# Patient Record
Sex: MALE | URBAN - METROPOLITAN AREA
[De-identification: names, ages, dates, MRNs, and addresses within clinical notes are randomized per-mention and may not be internally consistent; named-entity substitution may affect disease eponyms.]

---

## 2018-06-15 ENCOUNTER — TELEPHONE (OUTPATIENT)
Dept: FAMILY MEDICINE CLINIC | Facility: CLINIC | Age: 53
End: 2018-06-15

## 2018-06-15 ENCOUNTER — OFFICE VISIT (OUTPATIENT)
Dept: FAMILY MEDICINE CLINIC | Facility: CLINIC | Age: 53
End: 2018-06-15

## 2018-06-15 VITALS
TEMPERATURE: 98 F | HEART RATE: 78 BPM | HEIGHT: 66 IN | WEIGHT: 211.19 LBS | RESPIRATION RATE: 18 BRPM | BODY MASS INDEX: 33.94 KG/M2 | SYSTOLIC BLOOD PRESSURE: 108 MMHG | DIASTOLIC BLOOD PRESSURE: 72 MMHG

## 2018-06-15 DIAGNOSIS — E78.5 HYPERLIPIDEMIA, UNSPECIFIED HYPERLIPIDEMIA TYPE: ICD-10-CM

## 2018-06-15 DIAGNOSIS — E11.9 TYPE 2 DIABETES MELLITUS WITHOUT COMPLICATION, WITH LONG-TERM CURRENT USE OF INSULIN (HCC): ICD-10-CM

## 2018-06-15 DIAGNOSIS — H60.501 ACUTE OTITIS EXTERNA OF RIGHT EAR, UNSPECIFIED TYPE: ICD-10-CM

## 2018-06-15 DIAGNOSIS — Z00.00 PHYSICAL EXAM: Primary | ICD-10-CM

## 2018-06-15 DIAGNOSIS — Z79.4 TYPE 2 DIABETES MELLITUS WITHOUT COMPLICATION, WITH LONG-TERM CURRENT USE OF INSULIN (HCC): ICD-10-CM

## 2018-06-15 DIAGNOSIS — I10 ESSENTIAL HYPERTENSION: ICD-10-CM

## 2018-06-15 PROCEDURE — 99213 OFFICE O/P EST LOW 20 MIN: CPT | Performed by: FAMILY MEDICINE

## 2018-06-15 PROCEDURE — 99386 PREV VISIT NEW AGE 40-64: CPT | Performed by: FAMILY MEDICINE

## 2018-06-15 RX ORDER — LOVASTATIN 40 MG/1
40 TABLET ORAL NIGHTLY
COMMUNITY
End: 2018-08-29

## 2018-06-15 RX ORDER — LISINOPRIL 2.5 MG/1
2.5 TABLET ORAL DAILY
COMMUNITY
End: 2018-08-29

## 2018-06-15 RX ORDER — NEOMYCIN SULFATE, POLYMYXIN B SULFATE AND HYDROCORTISONE 10; 3.5; 1 MG/ML; MG/ML; [USP'U]/ML
3 SUSPENSION/ DROPS AURICULAR (OTIC) 4 TIMES DAILY
Qty: 1 BOTTLE | Refills: 0 | Status: SHIPPED | OUTPATIENT
Start: 2018-06-15 | End: 2018-09-15 | Stop reason: ALTCHOICE

## 2018-06-15 RX ORDER — GLIPIZIDE 10 MG/1
10 TABLET ORAL
COMMUNITY
End: 2018-08-29

## 2018-06-15 NOTE — TELEPHONE ENCOUNTER
Pharmacy is calling stating that the quantity for Cipro comes in 10 mL bottles. Can you please advise on how much to dispense. Thank you.

## 2018-06-15 NOTE — PROGRESS NOTES
[facility]      HPI:   Clark Hartley is a 46year old male who presents for an Annual Health Visit. Patient presents to establish care at clinic. Has history of diabetes, hypertension and hyperlipidemia.   He is currently taking Smokeless tobacco: Never Used                      Alcohol use:  No              Social History Narrative    None on file         REVIEW OF SYSTEMS:   GENERAL HEALTH: feels well otherwise   SKIN: denies any unusual skin lesions or ra S3, no S4; no click; murmur negative  ABDOMEN: normal active BS+, soft, nontender, nondistended; no HSM; no masses; no bruits.   GENITAL/: penis normal; no testicular masses; no inguinal hernias  LYMPHATIC: no lymphadenopathy  MUSCULOSKELETAL: no acute sy to monitor glucose level, call if glucose level less than 70 or more than 300. The patient indicates understanding of these issues and agrees to the plan.     Problem List:  Patient Active Problem List:     Type 2 diabetes mellitus without complicat

## 2018-06-15 NOTE — PATIENT INSTRUCTIONS
Continue with lantus at 10 units in morning. Continue current medications   Return to clinic for fasting labs.    Advice low carb in diet, AVOID FOOD WITH HIGH GLYCEMIC INDEX, have smaller portion meal, no juice or soda, don't eat late at night, exercise,

## 2018-06-16 ENCOUNTER — LABORATORY ENCOUNTER (OUTPATIENT)
Dept: LAB | Age: 53
End: 2018-06-16
Attending: FAMILY MEDICINE
Payer: COMMERCIAL

## 2018-06-16 DIAGNOSIS — Z00.00 PHYSICAL EXAM: ICD-10-CM

## 2018-06-16 DIAGNOSIS — E11.9 TYPE 2 DIABETES MELLITUS WITHOUT COMPLICATION, WITH LONG-TERM CURRENT USE OF INSULIN (HCC): ICD-10-CM

## 2018-06-16 DIAGNOSIS — Z79.4 TYPE 2 DIABETES MELLITUS WITHOUT COMPLICATION, WITH LONG-TERM CURRENT USE OF INSULIN (HCC): ICD-10-CM

## 2018-06-16 DIAGNOSIS — E78.5 HYPERLIPIDEMIA, UNSPECIFIED HYPERLIPIDEMIA TYPE: ICD-10-CM

## 2018-06-16 PROCEDURE — 36415 COLL VENOUS BLD VENIPUNCTURE: CPT | Performed by: FAMILY MEDICINE

## 2018-06-16 PROCEDURE — 80050 GENERAL HEALTH PANEL: CPT | Performed by: FAMILY MEDICINE

## 2018-06-16 PROCEDURE — 80061 LIPID PANEL: CPT | Performed by: FAMILY MEDICINE

## 2018-06-16 PROCEDURE — 84153 ASSAY OF PSA TOTAL: CPT | Performed by: FAMILY MEDICINE

## 2018-06-16 PROCEDURE — 83036 HEMOGLOBIN GLYCOSYLATED A1C: CPT | Performed by: FAMILY MEDICINE

## 2018-06-16 PROCEDURE — 81003 URINALYSIS AUTO W/O SCOPE: CPT | Performed by: FAMILY MEDICINE

## 2018-06-18 ENCOUNTER — TELEPHONE (OUTPATIENT)
Dept: FAMILY MEDICINE CLINIC | Facility: CLINIC | Age: 53
End: 2018-06-18

## 2018-06-18 NOTE — TELEPHONE ENCOUNTER
----- Message from Gerry Berry MD sent at 6/18/2018  8:29 AM CDT -----  Please inform patient that his hemoglobin A1c 7.7, his glucose level is 131. Recommend to continue with current diabetes medication.   Also recommend watching carb in his diet very c

## 2018-06-18 NOTE — TELEPHONE ENCOUNTER
Incoming fax from [employer]. Your physician's ST request Solitario Domingo has been reviewed by [address] [place] [address]. Because of the documentation provided by Dr Iveth Gray, the following outcome has been determined:    Granted 6/15/18 through 6/15/19.  Please

## 2018-06-20 ENCOUNTER — TELEPHONE (OUTPATIENT)
Dept: FAMILY MEDICINE CLINIC | Facility: CLINIC | Age: 53
End: 2018-06-20

## 2018-06-20 NOTE — TELEPHONE ENCOUNTER
There is no cheeper option. Recommend patient makes appointment to discuss changing other medications.

## 2018-06-20 NOTE — TELEPHONE ENCOUNTER
Pharmacy is asking that we send a cheaper alternative for Tanzeum 30mg. Patients has a high deductable.

## 2018-06-20 NOTE — TELEPHONE ENCOUNTER
Patient denied appt at this time. Patient states that he thinks no matter what the medication is it will cost a lot due to his deductible. Patient will call office back if he changes his mind.

## 2018-08-29 ENCOUNTER — TELEPHONE (OUTPATIENT)
Dept: FAMILY MEDICINE CLINIC | Facility: CLINIC | Age: 53
End: 2018-08-29

## 2018-08-29 RX ORDER — GLIPIZIDE 10 MG/1
10 TABLET ORAL
Qty: 60 TABLET | Refills: 2 | Status: SHIPPED | OUTPATIENT
Start: 2018-08-29

## 2018-08-29 RX ORDER — LOVASTATIN 40 MG/1
40 TABLET ORAL NIGHTLY
Qty: 30 TABLET | Refills: 2 | Status: SHIPPED | OUTPATIENT
Start: 2018-08-29

## 2018-08-29 RX ORDER — LISINOPRIL 2.5 MG/1
2.5 TABLET ORAL DAILY
Qty: 30 TABLET | Refills: 2 | Status: SHIPPED | OUTPATIENT
Start: 2018-08-29

## 2018-08-29 NOTE — TELEPHONE ENCOUNTER
patient is requesting refill for lisinopril, lovastation tanzeum,and glimiperide - please send to [facility]

## 2018-08-29 NOTE — TELEPHONE ENCOUNTER
Future Appointments  Date Time Provider Twyla Payal   9/15/2018 8:40 AM Altagracia Garcia MD [facility] [place]      Return in about 3 months (around 9/15/2018).

## 2018-09-15 ENCOUNTER — OFFICE VISIT (OUTPATIENT)
Dept: FAMILY MEDICINE CLINIC | Facility: CLINIC | Age: 53
End: 2018-09-15
Payer: COMMERCIAL

## 2018-09-15 VITALS
RESPIRATION RATE: 18 BRPM | WEIGHT: 208.5 LBS | DIASTOLIC BLOOD PRESSURE: 66 MMHG | BODY MASS INDEX: 33.51 KG/M2 | SYSTOLIC BLOOD PRESSURE: 100 MMHG | TEMPERATURE: 96 F | HEART RATE: 84 BPM | HEIGHT: 66 IN

## 2018-09-15 DIAGNOSIS — Z79.4 TYPE 2 DIABETES MELLITUS WITHOUT COMPLICATION, WITH LONG-TERM CURRENT USE OF INSULIN (HCC): Primary | ICD-10-CM

## 2018-09-15 DIAGNOSIS — E11.9 TYPE 2 DIABETES MELLITUS WITHOUT COMPLICATION, WITH LONG-TERM CURRENT USE OF INSULIN (HCC): Primary | ICD-10-CM

## 2018-09-15 DIAGNOSIS — I10 ESSENTIAL HYPERTENSION: ICD-10-CM

## 2018-09-15 DIAGNOSIS — E78.5 HYPERLIPIDEMIA, UNSPECIFIED HYPERLIPIDEMIA TYPE: ICD-10-CM

## 2018-09-15 DIAGNOSIS — Z23 NEEDS FLU SHOT: ICD-10-CM

## 2018-09-15 PROCEDURE — 90686 IIV4 VACC NO PRSV 0.5 ML IM: CPT | Performed by: FAMILY MEDICINE

## 2018-09-15 PROCEDURE — 90471 IMMUNIZATION ADMIN: CPT | Performed by: FAMILY MEDICINE

## 2018-09-15 PROCEDURE — 99214 OFFICE O/P EST MOD 30 MIN: CPT | Performed by: FAMILY MEDICINE

## 2018-09-15 NOTE — PROGRESS NOTES
[address]  PROGRESS NOTE  Chief Complaint:   Patient presents with:   Follow - Up  Diabetes      HPI:   This is a 46year old male with history of diabetes, hypertension and hyperlipidemia presents for follow-up    Has  been compliant wi Lovastatin 40 MG Oral Tab Take 1 tablet (40 mg total) by mouth nightly. Disp: 30 tablet Rfl: 2   glipiZIDE 10 MG Oral Tab Take 1 tablet (10 mg total) by mouth 2 (two) times daily before meals.  Disp: 60 tablet Rfl: 2   Albiglutide (TANZEUM) 30 MG Subcutan 8 oz.   Vital signs reviewed. Physical Exam:  GEN:  Patient is alert, awake and oriented, well developed, well nourished, no acute distress. EYES:  Sclera anicteric, conjunctiva normal, PERRLA, EOMI.   NECK: Supple, no carotid bruit, no JVD, no thyromega 09[birth year]1965  Annual Depression Screen due on 09/27/1977  FIT Colorectal Screening due on 09/27/2015  Pneumococcal PPSV23 Medium Risk Adult(1 of 1 - PPSV23) due on 01/19/2018  Influenza Vaccine(1) due on 09/01/2018    Patient/Caregiver Education: Patient/Car

## 2018-09-15 NOTE — PATIENT INSTRUCTIONS
Continue current medications  Increase lantus to 15 units. Advice low carb in diet, AVOID FOOD WITH HIGH GLYCEMIC INDEX, have smaller portion meal, no juice or soda, don't eat late at night, exercise, weight loss.    Continue to monitor glucose level, brenda

## 2018-11-26 NOTE — TELEPHONE ENCOUNTER
Future Appointments   Date Time Provider Twyla Moe   12/13/2018  8:45 AM [facility]   12/17/2018  1:20 PM Ruben Torrez MD [facility] [place]      Return in about 3 months (around 12/15/2018) for follow up.

## 2018-12-13 ENCOUNTER — APPOINTMENT (OUTPATIENT)
Dept: LAB | Age: 53
End: 2018-12-13
Attending: FAMILY MEDICINE
Payer: COMMERCIAL

## 2018-12-13 DIAGNOSIS — E11.9 TYPE 2 DIABETES MELLITUS WITHOUT COMPLICATION, WITH LONG-TERM CURRENT USE OF INSULIN (HCC): ICD-10-CM

## 2018-12-13 DIAGNOSIS — Z79.4 TYPE 2 DIABETES MELLITUS WITHOUT COMPLICATION, WITH LONG-TERM CURRENT USE OF INSULIN (HCC): ICD-10-CM

## 2018-12-13 PROCEDURE — 82043 UR ALBUMIN QUANTITATIVE: CPT | Performed by: FAMILY MEDICINE

## 2018-12-13 PROCEDURE — 82570 ASSAY OF URINE CREATININE: CPT | Performed by: FAMILY MEDICINE

## 2018-12-13 PROCEDURE — 80053 COMPREHEN METABOLIC PANEL: CPT | Performed by: FAMILY MEDICINE

## 2018-12-13 PROCEDURE — 83036 HEMOGLOBIN GLYCOSYLATED A1C: CPT | Performed by: FAMILY MEDICINE

## 2018-12-13 PROCEDURE — 36415 COLL VENOUS BLD VENIPUNCTURE: CPT | Performed by: FAMILY MEDICINE

## 2018-12-17 ENCOUNTER — OFFICE VISIT (OUTPATIENT)
Dept: FAMILY MEDICINE CLINIC | Facility: CLINIC | Age: 53
End: 2018-12-17
Payer: COMMERCIAL

## 2018-12-17 VITALS
SYSTOLIC BLOOD PRESSURE: 130 MMHG | DIASTOLIC BLOOD PRESSURE: 80 MMHG | HEIGHT: 66 IN | HEART RATE: 78 BPM | BODY MASS INDEX: 33.83 KG/M2 | TEMPERATURE: 97 F | RESPIRATION RATE: 16 BRPM | WEIGHT: 210.5 LBS

## 2018-12-17 DIAGNOSIS — E78.5 HYPERLIPIDEMIA, UNSPECIFIED HYPERLIPIDEMIA TYPE: ICD-10-CM

## 2018-12-17 DIAGNOSIS — I10 ESSENTIAL HYPERTENSION: ICD-10-CM

## 2018-12-17 DIAGNOSIS — E11.9 TYPE 2 DIABETES MELLITUS WITHOUT COMPLICATION, WITH LONG-TERM CURRENT USE OF INSULIN (HCC): Primary | ICD-10-CM

## 2018-12-17 DIAGNOSIS — Z79.4 TYPE 2 DIABETES MELLITUS WITHOUT COMPLICATION, WITH LONG-TERM CURRENT USE OF INSULIN (HCC): Primary | ICD-10-CM

## 2018-12-17 PROCEDURE — 99214 OFFICE O/P EST MOD 30 MIN: CPT | Performed by: FAMILY MEDICINE

## 2018-12-17 NOTE — PATIENT INSTRUCTIONS
Continue current medications  Increase lantus to 25 units. Advice low carb in diet, AVOID FOOD WITH HIGH GLYCEMIC INDEX, have smaller portion meal, no juice or soda, don't eat late at night, exercise, weight loss.    Continue to monitor glucose level, brenda

## 2018-12-17 NOTE — PROGRESS NOTES
[address]  PROGRESS NOTE  Chief Complaint:   Patient presents with:   Follow - Up: 3month/lab results      HPI:   This is a 48year old male with history of diabetes, hypertension and hyperlipidemia presents for follow-up    Has  been co (1,000 mg total) by mouth 2 (two) times daily with meals. Disp: 180 tablet Rfl: 0   lisinopril 2.5 MG Oral Tab Take 1 tablet (2.5 mg total) by mouth daily. Disp: 30 tablet Rfl: 2   Lovastatin 40 MG Oral Tab Take 1 tablet (40 mg total) by mouth nightly.  Dis Height as of this encounter: 66\". Weight as of this encounter: 210 lb 8 oz. Vital signs reviewed. Physical Exam:  GEN:  Patient is alert, awake and oriented, well developed, well nourished, no acute distress.    EYES:  Sclera anicteric, conjunctiva n - PPSV23) due on 01/19/2018    Patient/Caregiver Education: Patient/Caregiver Education: There are no barriers to learning. Medical education done. Outcome: Patient verbalizes understanding.  Patient is notified to call with any questions, complications,

## 2019-01-23 ENCOUNTER — TELEPHONE (OUTPATIENT)
Dept: FAMILY MEDICINE CLINIC | Facility: CLINIC | Age: 54
End: 2019-01-23

## 2019-02-08 NOTE — TELEPHONE ENCOUNTER
I contacted [place] today. He informed me that his insurance changed and he had to change PCP's. His new PCP is now Dr Rangel Noel with [facility].

## 2019-08-19 RX ORDER — PEN NEEDLE, DIABETIC 32GX 5/32"
NEEDLE, DISPOSABLE MISCELLANEOUS
Qty: 100 EACH | Refills: 11 | Status: SHIPPED | OUTPATIENT
Start: 2019-08-19

## 2019-08-19 NOTE — TELEPHONE ENCOUNTER
Future appt:     Last Appointment with provider:  12/17/2018; Return in about 1 month (around 1/17/2019) for follow up.      Last appointment at [facility]:  Visit date not found  Cholesterol, Total (mg/dL)   Date Value   06/16/2018 129     HDL Cholestero

## 2020-03-09 ENCOUNTER — WORKER'S COMP (OUTPATIENT)
Dept: OCCUPATIONAL MEDICINE | Age: 55
End: 2020-03-09

## 2020-03-09 ENCOUNTER — IMAGING SERVICES (OUTPATIENT)
Dept: GENERAL RADIOLOGY | Age: 55
End: 2020-03-09
Attending: PHYSICIAN ASSISTANT

## 2020-03-09 DIAGNOSIS — S90.32XA CONTUSION OF LEFT FOOT: ICD-10-CM

## 2020-03-09 DIAGNOSIS — S90.32XA CONTUSION OF LEFT FOOT, INITIAL ENCOUNTER: ICD-10-CM

## 2020-03-09 DIAGNOSIS — S92.425A CLOSED NONDISPLACED FRACTURE OF DISTAL PHALANX OF LEFT GREAT TOE, INITIAL ENCOUNTER: Primary | ICD-10-CM

## 2020-03-09 DIAGNOSIS — Z02.71 ISSUE OF MEDICAL CERTIFICATE FOR DISABILITY EXAMINATION: ICD-10-CM

## 2020-03-09 PROCEDURE — 99203 OFFICE O/P NEW LOW 30 MIN: CPT | Performed by: PHYSICIAN ASSISTANT

## 2020-03-09 PROCEDURE — L3260 AMBULATORY SURGICAL BOOT EAC: HCPCS | Performed by: PHYSICIAN ASSISTANT

## 2020-03-09 PROCEDURE — 73630 X-RAY EXAM OF FOOT: CPT | Performed by: RADIOLOGY

## 2020-03-09 ASSESSMENT — PAIN SCALES - GENERAL: PAINLEVEL: 7-8

## 2020-03-10 ENCOUNTER — TELEPHONE (OUTPATIENT)
Dept: OCCUPATIONAL MEDICINE | Age: 55
End: 2020-03-10

## 2020-03-16 ENCOUNTER — WORKER'S COMP (OUTPATIENT)
Dept: OCCUPATIONAL MEDICINE | Age: 55
End: 2020-03-16

## 2020-03-16 DIAGNOSIS — S92.425D CLOSED NONDISPLACED FRACTURE OF DISTAL PHALANX OF LEFT GREAT TOE WITH ROUTINE HEALING, SUBSEQUENT ENCOUNTER: Primary | ICD-10-CM

## 2020-03-16 PROCEDURE — 99213 OFFICE O/P EST LOW 20 MIN: CPT | Performed by: PHYSICIAN ASSISTANT

## 2020-03-16 ASSESSMENT — PAIN SCALES - GENERAL: PAINLEVEL: 1-2

## 2020-03-30 ENCOUNTER — APPOINTMENT (OUTPATIENT)
Dept: OCCUPATIONAL MEDICINE | Age: 55
End: 2020-03-30

## 2021-02-15 RX ORDER — PEN NEEDLE, DIABETIC 32GX 5/32"
NEEDLE, DISPOSABLE MISCELLANEOUS
Qty: 100 EACH | Refills: 0 | OUTPATIENT
Start: 2021-02-15

## 2021-02-15 NOTE — TELEPHONE ENCOUNTER
Future appt:    Last Appointment with provider:  12/17/2018    Last appointment at [facility]:  Visit date not found  Cholesterol, Total (mg/dL)   Date Value   06/16/2018 129     HDL Cholesterol (mg/dL)   Date Value   06/16/2018 27 (L)     LDL Cholestero

## 2021-02-15 NOTE — TELEPHONE ENCOUNTER
Spoke with patient as he has not been seen in >2 years. States he has a new PCP now. He will have RF request sent to new provider. PCP has already been changed in [place].

## 2021-02-17 RX ORDER — PEN NEEDLE, DIABETIC 32GX 5/32"
NEEDLE, DISPOSABLE MISCELLANEOUS
Qty: 100 EACH | Refills: 0 | OUTPATIENT
Start: 2021-02-17

## 2021-05-26 VITALS
RESPIRATION RATE: 15 BRPM | TEMPERATURE: 96.9 F | DIASTOLIC BLOOD PRESSURE: 74 MMHG | SYSTOLIC BLOOD PRESSURE: 124 MMHG | SYSTOLIC BLOOD PRESSURE: 115 MMHG | HEART RATE: 79 BPM | HEART RATE: 76 BPM | TEMPERATURE: 98.3 F | DIASTOLIC BLOOD PRESSURE: 80 MMHG

## 2023-07-08 NOTE — TELEPHONE ENCOUNTER
Please inform patient that his last hemoglobin A1c was 8.5, his diabetes is not very well controlled. Was recommended to follow-up with me on 1/17/2019. Patient is overdue for appointment recommend to schedule appointment with me as soon as possible. no fever and no chills.

## (undated) NOTE — LETTER
10/15/18        Katie Avila  [address] [place] [address]      Dear Jennie Orozco records indicate that you have outstanding lab work and or testing that was ordered for you and has not yet been completed:  Orders Placed This Encounter